# Patient Record
Sex: FEMALE | Race: WHITE | Employment: FULL TIME | URBAN - METROPOLITAN AREA
[De-identification: names, ages, dates, MRNs, and addresses within clinical notes are randomized per-mention and may not be internally consistent; named-entity substitution may affect disease eponyms.]

---

## 2021-08-30 ENCOUNTER — HOSPITAL ENCOUNTER (EMERGENCY)
Age: 46
Discharge: HOME | End: 2021-08-30
Attending: EMERGENCY MEDICINE
Payer: MEDICAID

## 2021-08-30 VITALS
HEART RATE: 80 BPM | BODY MASS INDEX: 33.94 KG/M2 | DIASTOLIC BLOOD PRESSURE: 87 MMHG | HEIGHT: 67 IN | WEIGHT: 216.27 LBS | SYSTOLIC BLOOD PRESSURE: 139 MMHG | TEMPERATURE: 99.2 F | OXYGEN SATURATION: 100 % | RESPIRATION RATE: 22 BRPM

## 2021-08-30 DIAGNOSIS — T16.2XXA FOREIGN BODY OF LEFT EAR, INITIAL ENCOUNTER: ICD-10-CM

## 2021-08-30 DIAGNOSIS — H60.392 INFECTIVE OTITIS EXTERNA OF LEFT EAR: Primary | ICD-10-CM

## 2021-08-30 PROCEDURE — 99283 EMERGENCY DEPT VISIT LOW MDM: CPT

## 2021-08-30 RX ORDER — HYDROCODONE BITARTRATE AND ACETAMINOPHEN 5; 325 MG/1; MG/1
1 TABLET ORAL EVERY 8 HOURS PRN
Qty: 6 TABLET | Refills: 0 | Status: SHIPPED | OUTPATIENT
Start: 2021-08-30 | End: 2021-09-01

## 2021-08-30 RX ORDER — PSEUDOEPHEDRINE HYDROCHLORIDE 30 MG/1
30 TABLET ORAL 3 TIMES DAILY
Qty: 30 TABLET | Refills: 0 | Status: SHIPPED | OUTPATIENT
Start: 2021-08-30

## 2021-08-30 RX ORDER — DOXYCYCLINE HYCLATE 100 MG/1
100 CAPSULE ORAL 2 TIMES DAILY
Qty: 20 CAPSULE | Refills: 0 | Status: SHIPPED | OUTPATIENT
Start: 2021-08-30 | End: 2021-09-09

## 2021-08-30 RX ORDER — CIPROFLOXACIN AND DEXAMETHASONE 3; 1 MG/ML; MG/ML
4 SUSPENSION/ DROPS AURICULAR (OTIC) 2 TIMES DAILY
Qty: 1 BOTTLE | Refills: 0 | Status: SHIPPED | OUTPATIENT
Start: 2021-08-30 | End: 2021-09-06

## 2021-08-30 ASSESSMENT — PAIN SCALES - GENERAL
PAINLEVEL_OUTOF10: 7
PAINLEVEL_OUTOF10: 7

## 2021-08-30 ASSESSMENT — ENCOUNTER SYMPTOMS
SORE THROAT: 0
EYE PAIN: 0
BACK PAIN: 0
NAUSEA: 0
DIARRHEA: 0
ABDOMINAL PAIN: 0
RHINORRHEA: 0
COUGH: 0
VOMITING: 0
WHEEZING: 0
SHORTNESS OF BREATH: 0
EYE DISCHARGE: 0

## 2021-08-30 ASSESSMENT — PAIN DESCRIPTION - PAIN TYPE: TYPE: ACUTE PAIN

## 2021-08-30 ASSESSMENT — PAIN DESCRIPTION - FREQUENCY: FREQUENCY: CONTINUOUS

## 2021-08-30 ASSESSMENT — PAIN DESCRIPTION - DESCRIPTORS: DESCRIPTORS: SORE

## 2021-08-30 ASSESSMENT — PAIN DESCRIPTION - ONSET: ONSET: SUDDEN

## 2021-08-30 ASSESSMENT — PAIN DESCRIPTION - ORIENTATION: ORIENTATION: LEFT

## 2021-08-30 NOTE — ED PROVIDER NOTES
[address] [facility]Y dEPARTMENT eNCOUnter        Pt Name: Marilu Medina  MRN: [id number]  Dexgflazara 1975  Date of evaluation: 8/30/2021  Provider: Mandy Jensen MD  PCP: No primary care provider on file. CHIEF COMPLAINT       Chief Complaint   Patient presents with    Otalgia     States that she had a recent flight, and is now having pain in the left side of her face, temple, jaw and is having a sore throat on the left side of her throat. HISTORY OFPRESENT ILLNESS   (Location/Symptom, Timing/Onset, Context/Setting, Quality, Duration, Modifying Factors,Severity)  Note limiting factors. Monica Zavala is a 51 y.o. female       Location/Symptom: Left ear pain  Timing/Onset: Wednesday night into Thursday morning  Context/Setting: Patient is visiting from [place]. She did fly. Landed Wednesday and started having discomfort on Thursday. She did tell me that she is somebody that likes to use Q-tips and is often putting peroxide in her ears  Quality: Aching pain  Duration: About 4 to 5 days  Modifying Factors: It does hurt somewhat to touch the ear she is also noticed the pain radiating inferiorly towards the left jaw and left neck. She does have some associated tinnitus and vertigo symptoms  Severity: 7 out of 10    Nursing Noteswere all reviewed and agreed with or any disagreements were addressed  in the HPI. REVIEW OF SYSTEMS    (2-9 systems for level 4, 10 or more for level 5)     Review of Systems   Constitutional: Negative for chills, fatigue and fever. HENT: Positive for ear pain and tinnitus. Negative for rhinorrhea and sore throat. Eyes: Negative for pain, discharge and visual disturbance. Respiratory: Negative for cough, shortness of breath and wheezing. Cardiovascular: Negative for chest pain, palpitations and leg swelling. Gastrointestinal: Negative for abdominal pain, diarrhea, nausea and vomiting.    Genitourinary: Negative for difficulty urinating, dysuria, pelvic pain and vaginal discharge. Musculoskeletal: Negative for arthralgias, back pain, joint swelling and neck pain. Skin: Negative for rash. Allergic/Immunologic: Negative for environmental allergies. Neurological: Positive for dizziness. Negative for seizures, syncope and headaches. Hematological: Negative for adenopathy. Psychiatric/Behavioral: Negative for dysphoric mood and suicidal ideas. The patient is not nervous/anxious. PAST MEDICAL HISTORY   History reviewed. No pertinent past medical history. SURGICAL HISTORY   History reviewed. No pertinent surgical history. [id number]       Discharge Medication List as of 8/30/2021 11:29 AM          ALLERGIES     Patient has no known allergies. FAMILY HISTORY     History reviewed. No pertinent family history. SOCIAL HISTORY       Social History     Socioeconomic History    Marital status: Single     Spouse name: None    Number of children: None    Years of education: None    Highest education level: None   Occupational History    None   Tobacco Use    Smoking status: Never Smoker    Smokeless tobacco: Never Used   Vaping Use    Vaping Use: Never used   Substance and Sexual Activity    Alcohol use: Not Currently    Drug use: Never    Sexual activity: Not Currently   Other Topics Concern    None   Social History Narrative    None     Social Determinants of Health     Financial Resource Strain:     Difficulty of Paying Living Expenses:    Food Insecurity:     Worried About Running Out of Food in the Last Year:     Ran Out of Food in the Last Year:    Transportation Needs:     Lack of Transportation (Medical):      Lack of Transportation (Non-Medical):    Physical Activity:     Days of Exercise per Week:     Minutes of Exercise per Session:    Stress:     Feeling of Stress :    Social Connections:     Frequency of Communication with Friends and Family:     Frequency of Social Gatherings with Friends and Family:     Attends Congregation Services:     Active Member of Clubs or Organizations:     Attends Club or Organization Meetings:     Marital Status:    Intimate Partner Violence:     Fear of Current or Ex-Partner:     Emotionally Abused:     Physically Abused:     Sexually Abused:        SCREENINGS             PHYSICAL EXAM    (up to 7 for level 4, 8 or more for level 5)     ED Triage Vitals [08/30/21 0917]   BP Temp Temp Source Pulse Resp SpO2 Height Weight   139/87 99.2 °F (37.3 °C) Oral 80 22 100 % 5' 7\" (1.702 m) 216 lb 4.3 oz (98.1 kg)      height is 5' 7\" (1.702 m) and weight is 216 lb 4.3 oz (98.1 kg). Her oral temperature is 99.2 °F (37.3 °C). Her blood pressure is 139/87 and her pulse is 80. Her respiration is 22 and oxygen saturation is 100%. Physical Exam  Vitals and nursing note reviewed. Constitutional:       Appearance: She is well-developed. She is not diaphoretic. HENT:      Head: Normocephalic and atraumatic. Comments: Patient does have tenderness of the left tragus but only mildly so. She has not been swimming. It does appear to what could be a retained foreign body in the left ear that could very likely be a piece of Q-tip. Canal is swollen. Tympanic membrane is not visualized. She also has tenderness along the inferior auricular area and a palpable tenderness of the right deep anterior cervical chain. No trismus. Right Ear: External ear normal.      Left Ear: External ear normal.   Eyes:      General: No scleral icterus. Right eye: No discharge. Left eye: No discharge. Extraocular Movements:      Right eye: Normal extraocular motion and no nystagmus. Left eye: Normal extraocular motion and no nystagmus. Conjunctiva/sclera: Conjunctivae normal.   Neck:      Trachea: No tracheal deviation. Pulmonary:      Effort: Pulmonary effort is normal. No respiratory distress. Breath sounds:  No stridor. Musculoskeletal:         General: Normal range of motion. Cervical back: Normal range of motion. Skin:     General: Skin is warm and dry. Neurological:      General: No focal deficit present. Mental Status: She is alert and oriented to person, place, and time. GCS: GCS eye subscore is 4. GCS verbal subscore is 5. GCS motor subscore is 6. Cranial Nerves: Cranial nerves are intact. Motor: Motor function is intact. Coordination: Romberg sign negative. Coordination normal. Finger-Nose-Finger Test normal.   Psychiatric:         Behavior: Behavior normal.         DIAGNOSTIC RESULTS   LABS:    No results found for this visit on 08/30/21. All other labs were within normal range or not returned as of this dictation. EKG: All EKG's are interpreted by the Emergency Department Physician who either signs orCo-signs this chart in the absence of a cardiologist.    None    RADIOLOGY:   Non-plain film images such as CT, Ultrasound and MRI are read by the radiologist. Plain radiographic images are visualized and preliminarily interpreted by the  EDProvider with the below findings:    None        PROCEDURES   Unless otherwise noted below, none     Procedures    CRITICAL CARE TIME   N/A    CONSULTS:  None    EMERGENCY DEPARTMENT COURSE and DIFFERENTIAL DIAGNOSIS/MDM:   Vitals:    Vitals:    08/30/21 0917   BP: 139/87   Pulse: 80   Resp: 22   Temp: 99.2 °F (37.3 °C)   TempSrc: Oral   SpO2: 100%   Weight: 216 lb 4.3 oz (98.1 kg)   Height: 5' 7\" (1.702 m)       Patient was given the following medications:  Medications - No data to display    Stable. I did attempt to irrigate this. However on my first attempt to \"so much pain I had to stop immediately. I was using the catheter of a 20-gauge Angiocath. So, I cut that catheter off and just use the tubing on these new catheters that we have.   I was then able to more gently do this but the canal is swollen distally and this probable foreign body seems lodged against the tympanic membrane. So I did not persist.  I contacted ear nose and throat on call. Dr. Warden Kim is on-call but he is office is across [facility]. I then called the office of Dr. Isabela Hagan and Dr. Jayda Ayala. I was able to get the patient an appointment tomorrow at 130. She also is from [place] and on [place]. I checked the details of that for the patient as well. Basically, she is just required to pay $40 at the office and the office visit is $125 with a 40% discount. Patient was sure she could handle that financial burden. For today I am going to put her on Ciprodex Sudafed and doxycycline. I gave her a prescription for 6 tablets of Norco.  Follow-up will be tomorrow with ear nose and throat. FINAL IMPRESSION      1. Infective otitis externa of left ear    2. Foreign body of left ear, initial encounter          DISPOSITION/PLAN    DISPOSITION Decision To Discharge 08/30/2021 11:15:49 AM      PATIENT REFERRED TO:  Stephan Burris MD  [address] [place] [address]  [phone]    On 8/31/2021  at 130 PM      DISCHARGE MEDICATIONS:  Discharge Medication List as of 8/30/2021 11:29 AM      START taking these medications    Details   doxycycline hyclate (VIBRAMYCIN) 100 MG capsule Take 1 capsule by mouth 2 times daily for 10 days, Disp-20 capsule, R-0Print      HYDROcodone-acetaminophen (NORCO) 5-325 MG per tablet Take 1 tablet by mouth every 8 hours as needed for Pain for up to 2 days. Intended supply: 3 days. Take lowest dose possible to manage pain, Disp-6 tablet, R-0Print      pseudoephedrine (DECONGESTANT) 30 MG tablet Take 1 tablet by mouth 3 times daily, Disp-30 tablet, R-0Print      ciprofloxacin-dexamethasone (CIPRODEX) 0.3-0.1 % otic suspension Place 4 drops in ear(s) 2 times daily for 7 days, Disp-1 Bottle, R-0If cost or insurance becomes an issue you can substitute Cortisporin otic suspension. Dispense 1 bottle.   4 drops left ear 3 times a day for 1 weekPrint             DISCONTINUED MEDICATIONS:  Discharge Medication List as of 8/30/2021 11:29 AM                 (Please note that portions of this note were completed with a voice recognition program.  Efforts were made to editthe dictations but occasionally words are mis-transcribed.)    Diaz Renner MD (electronically signed)           Diaz Renner MD  08/30/21 [phone]